# Patient Record
Sex: FEMALE | Race: WHITE | Employment: OTHER | URBAN - METROPOLITAN AREA
[De-identification: names, ages, dates, MRNs, and addresses within clinical notes are randomized per-mention and may not be internally consistent; named-entity substitution may affect disease eponyms.]

---

## 2018-02-02 ENCOUNTER — HOSPITAL ENCOUNTER (OUTPATIENT)
Dept: MAMMOGRAPHY | Age: 78
Discharge: HOME | End: 2018-02-02
Attending: SURGERY | Admitting: SURGERY

## 2018-02-02 DIAGNOSIS — Z12.31 VISIT FOR SCREENING MAMMOGRAM: ICD-10-CM

## 2018-02-22 ENCOUNTER — HOSPITAL ENCOUNTER (OUTPATIENT)
Dept: MAMMOGRAPHY | Age: 78
Discharge: HOME | End: 2018-02-22
Attending: INTERNAL MEDICINE | Admitting: INTERNAL MEDICINE

## 2018-02-22 DIAGNOSIS — R92.8 ABNORMAL FINDING ON MAMMOGRAPHY: ICD-10-CM

## 2018-08-28 ENCOUNTER — OFFICE VISIT (OUTPATIENT)
Dept: ORTHOPEDIC SURGERY | Age: 78
End: 2018-08-28

## 2018-08-28 VITALS — HEIGHT: 60 IN | WEIGHT: 136 LBS | BODY MASS INDEX: 26.7 KG/M2

## 2018-08-28 DIAGNOSIS — M77.8 LEFT WRIST TENDINITIS: ICD-10-CM

## 2018-08-28 DIAGNOSIS — M79.642 LEFT HAND PAIN: Primary | ICD-10-CM

## 2018-08-28 DIAGNOSIS — M19.049 HAND ARTHRITIS: ICD-10-CM

## 2018-08-28 PROCEDURE — 99203 OFFICE O/P NEW LOW 30 MIN: CPT | Performed by: ORTHOPAEDIC SURGERY

## 2018-08-28 PROCEDURE — L3908 WHO COCK-UP NONMOLDE PRE OTS: HCPCS | Performed by: ORTHOPAEDIC SURGERY

## 2018-08-28 RX ORDER — FEXOFENADINE HCL 180 MG/1
180 TABLET ORAL
COMMUNITY

## 2018-08-28 RX ORDER — LISINOPRIL 10 MG/1
10 TABLET ORAL
COMMUNITY

## 2018-08-28 RX ORDER — ATENOLOL 100 MG/1
100 TABLET ORAL
COMMUNITY

## 2018-08-28 RX ORDER — ESZOPICLONE 1 MG/1
TABLET, FILM COATED ORAL
COMMUNITY

## 2018-08-28 RX ORDER — GLUCOSAMINE HCL 500 MG
300 TABLET ORAL
COMMUNITY

## 2018-08-28 RX ORDER — CHLORTHALIDONE 25 MG/1
25 TABLET ORAL
COMMUNITY

## 2018-08-28 NOTE — PROGRESS NOTES
Chief Complaint  New Patient (L HAND)      History of Present Illness:  Leydi Treadwell is a [de-identified] y.o. female. She presents today for a new hand surgery specialty evaluation at the recommendation of Dr. Harleen Jacobsen regarding her left hand and wrist.  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 In the middle the night she had exceptionally significant tenderness and some swelling and did go to an emergency department in [place] was started on a prednisone pack. She also was given a wrist brace. She has felt significant relief but is now concerned about the ability to get back to doing more cooking and not use her brace. She has in the past not been able to take oral anti-inflammatory medicines. She does not remember a specific traumatic event. Medical History:  History reviewed. No pertinent past medical history. History reviewed. No pertinent surgical history. Social History     Social History    Marital status:      Spouse name: N/A    Number of children: N/A    Years of education: N/A     Social History Main Topics    Smoking status: None    Smokeless tobacco: None    Alcohol use None    Drug use: Unknown    Sexual activity: Not Asked     Other Topics Concern    None     Social History Narrative    None     Current Outpatient Prescriptions   Medication Sig Dispense Refill    apixaban (ELIQUIS) 5 MG TABS tablet Take 5 mg by mouth      lisinopril (PRINIVIL;ZESTRIL) 10 MG tablet Take 10 mg by mouth      atenolol (TENORMIN) 100 MG tablet Take 100 mg by mouth      chlorthalidone (HYGROTON) 25 MG tablet Take 25 mg by mouth      eszopiclone (LUNESTA) 1 MG TABS Take by mouth. Jeramie Ear Coenzyme Q10 100 MG TABS Take 300 mg by mouth      fexofenadine (ALLEGRA) 180 MG tablet Take 180 mg by mouth       No current left consistent with likely tendinitis versus crystalline-induced arthropathy    Impression:  Encounter Diagnoses   Name Primary?  Left hand pain Yes    Left wrist tendinitis     Hand arthritis        Office Procedures:  Orders Placed This Encounter   Procedures    XR HAND LEFT (MIN 3 VIEWS)    [facility]     Referral Priority:   Routine     Referral Type:   Eval and Treat     Referral Reason:   Specialty Services Required     Requested Specialty:   Occupational Therapy     Number of Visits Requested:   1    DJO Wrist Wrap Brace     Patient was prescribed a [place] Wrist Wrap. The left wrist will require stabilization / immobilization from this semi-rigid / rigid orthosis to improve their function. The orthosis will assist in protecting the affected area, provide functional support and facilitate healing. The patient was educated and fit by a healthcare professional with expert knowledge and specialization in brace application while under the direct supervision of the treating physician. Verbal and written instructions for the use of and application of this item were provided. They were instructed to contact the office immediately should the brace result in increased pain, decreased sensation, increased swelling or worsening of the condition. Treatment Plan:  I believe currently she exhibits some ongoing tenderness along the extensor carpi ulnaris which would warrant some further treatment. She's not able to take anti-inflammatories we will give her a sample of a topical diclofenac to see if that would be helpful. In addition, I will give her referral to the hand therapy specialist for some local modalities and some education on home exercises. Finally, we will also demonstrate a different type of wrist brace that can allow for more freedom and ability to pursue some of the activities that are important to her. This could be used intermittently.     Down the road if she starts to have repeated symptoms, further testing for crystal-induced arthropathy or gout versus pseudogout can be investigated. Please note that this transcription was created using voice recognition software. Any errors are unintentional and may be due to voice recognition transcription.

## 2020-10-13 ENCOUNTER — HOSPITAL ENCOUNTER (OUTPATIENT)
Dept: CT IMAGING | Age: 80
Discharge: HOME | End: 2020-10-13
Payer: MEDICARE

## 2020-10-13 LAB
GFR AFRICAN AMERICAN: 57
GFR NON-AFRICAN AMERICAN: 47
PERFORMED ON: ABNORMAL
POC CREATININE: 1.1 MG/DL (ref 0.6–1.2)
POC SAMPLE TYPE: ABNORMAL

## 2020-10-13 PROCEDURE — 6360000004 HC RX CONTRAST MEDICATION: Performed by: INTERNAL MEDICINE

## 2020-10-13 PROCEDURE — 82565 ASSAY OF CREATININE: CPT

## 2020-10-13 PROCEDURE — 74177 CT ABD & PELVIS W/CONTRAST: CPT

## 2020-10-13 RX ADMIN — IOHEXOL 50 ML: 240 INJECTION, SOLUTION INTRATHECAL; INTRAVASCULAR; INTRAVENOUS; ORAL at 12:31

## 2020-10-13 RX ADMIN — IOPAMIDOL 80 ML: 755 INJECTION, SOLUTION INTRAVENOUS at 12:37

## 2021-05-04 ENCOUNTER — APPOINTMENT (OUTPATIENT)
Dept: CT IMAGING | Age: 81
End: 2021-05-04
Payer: MEDICARE

## 2021-05-04 ENCOUNTER — HOSPITAL ENCOUNTER (EMERGENCY)
Age: 81
Discharge: HOME | End: 2021-05-04
Attending: EMERGENCY MEDICINE
Payer: MEDICARE

## 2021-05-04 VITALS
HEART RATE: 95 BPM | BODY MASS INDEX: 24.35 KG/M2 | DIASTOLIC BLOOD PRESSURE: 62 MMHG | WEIGHT: 124 LBS | TEMPERATURE: 98.4 F | RESPIRATION RATE: 18 BRPM | HEIGHT: 60 IN | SYSTOLIC BLOOD PRESSURE: 142 MMHG | OXYGEN SATURATION: 95 %

## 2021-05-04 DIAGNOSIS — S09.90XA CLOSED HEAD INJURY, INITIAL ENCOUNTER: Primary | ICD-10-CM

## 2021-05-04 PROCEDURE — 99283 EMERGENCY DEPT VISIT LOW MDM: CPT

## 2021-05-04 PROCEDURE — 70450 CT HEAD/BRAIN W/O DYE: CPT

## 2021-05-04 ASSESSMENT — ENCOUNTER SYMPTOMS
NAUSEA: 0
VOMITING: 0
SHORTNESS OF BREATH: 0
BACK PAIN: 0

## 2021-05-04 NOTE — ED PROVIDER NOTES
ED Attending Attestation Note     Date of evaluation: 5/4/2021    This patient was seen by the advance practice provider. I have seen and examined the patient, agree with the workup, evaluation, management and diagnosis. The care plan has been discussed. My assessment reveals an elderly female who presents with hematoma to her right frontal scalp. Patient was attempting to walk around a pole sticking up out of the ground and got too close and lost her footing, falling and striking her head. No obvious traumatic injuries to any of her extremities. She is alert and oriented. CT scan obtained due to the fact that she is on Eliquis without any evidence of underlying bleed.      Gregor Sarabia MD  05/04/21 [id number]

## 2021-05-04 NOTE — ED NOTES
Bed: A09-09  Expected date:   Expected time:   Means of arrival:   Comments:  Vladimir Alex RN  05/04/21 [id number]

## 2021-05-04 NOTE — ED PROVIDER NOTES
[address] ENCOUNTER          PHYSICIAN ASSISTANT NOTE       Date of evaluation: 5/4/2021    Chief Complaint     Head Injury (Pt tripped and fell in hosptial lobby, hitting her head. No LOC. On Eliquis)      History of Present Illness     Rosalia Brink is a 78 y.o. female with PMH of Afib on Eliquis, Myelofibrosis on experimental infusions of VetaCrit, HTN, CHF, Anemia who presents after a mechanical fall in the hospital lobby. Patient states that she was attempting to walk around a pole sticking up out of the ground and lost her footing. She was carrying some bags and unable to break her fall, striking her head on the ground. She denies loss of consciousness. She is on Eliquis. She complains of tenderness to her forehead where she struck the ground. She denies any headache, vision changes, dizziness, nausea or vomiting, neck pain or back pain, chest pain or shortness of breath, dizziness, abdominal pain, other injuries. She states her tetanus was 18 months ago. Review of Systems     Review of Systems   Constitutional: Negative for chills and fever. HENT: Negative for congestion. Eyes: Negative for visual disturbance. Respiratory: Negative for shortness of breath. Cardiovascular: Negative for chest pain. Gastrointestinal: Negative for nausea and vomiting. Genitourinary: Negative for difficulty urinating. Musculoskeletal: Negative for back pain, gait problem and neck pain. Skin: Positive for wound (abrasion). Neurological: Negative for dizziness, weakness, numbness and headaches. Psychiatric/Behavioral: The patient is not nervous/anxious. Past Medical, Surgical, Family, and Social History     She has no past medical history on file. She has no past surgical history on file. Her family history is not on file. She reports that she has quit smoking. She has never used smokeless tobacco. She reports previous alcohol use.  She reports that she does not use drugs. Medications     Current Discharge Medication List      CONTINUE these medications which have NOT CHANGED    Details   apixaban (ELIQUIS) 5 MG TABS tablet Take 5 mg by mouth 2.5 mg PO twice daily      lisinopril (PRINIVIL;ZESTRIL) 10 MG tablet Take 10 mg by mouth      atenolol (TENORMIN) 100 MG tablet Take 100 mg by mouth      chlorthalidone (HYGROTON) 25 MG tablet Take 25 mg by mouth      eszopiclone (LUNESTA) 1 MG TABS Take by mouth. .      Coenzyme Q10 100 MG TABS Take 300 mg by mouth      fexofenadine (ALLEGRA) 180 MG tablet Take 180 mg by mouth             Allergies     She is allergic to furosemide. Physical Exam     INITIAL VITALS: BP: (!) 142/62,Temp: 98.4 °F (36.9 °C), Pulse: 95, Resp: 18, SpO2: 95 %   Physical Exam  Vitals signs and nursing note reviewed. Constitutional:       General: She is not in acute distress. HENT:      Head: Normocephalic. Contusion present. No raccoon eyes or Guerrero's sign. Jaw: There is normal jaw occlusion. Comments: Contusion with overlying abrasion to the right forehead     Right Ear: No hemotympanum. Left Ear: No mastoid tenderness. No hemotympanum. Mouth/Throat:      Mouth: Mucous membranes are moist.   Eyes:      Extraocular Movements: Extraocular movements intact. Conjunctiva/sclera: Conjunctivae normal.      Pupils: Pupils are equal, round, and reactive to light. Neck:      Musculoskeletal: Normal range of motion and neck supple. No spinous process tenderness. Cardiovascular:      Rate and Rhythm: Normal rate and regular rhythm. Pulmonary:      Effort: Pulmonary effort is normal. No respiratory distress. Breath sounds: Normal breath sounds. No wheezing, rhonchi or rales. Abdominal:      General: Bowel sounds are normal. There is no distension. Palpations: Abdomen is soft. Tenderness: There is no abdominal tenderness. There is no guarding or rebound. Musculoskeletal:         General: No deformity. Skin:     General: Skin is warm and dry. Neurological:      General: No focal deficit present. Mental Status: She is alert and oriented to person, place, and time. Cranial Nerves: No cranial nerve deficit or dysarthria. Sensory: No sensory deficit. Motor: No weakness. Coordination: Finger-Nose-Finger Test normal.   Psychiatric:         Mood and Affect: Mood normal.         Behavior: Behavior normal.         Diagnostic Results     RADIOLOGY:  CT HEAD WO CONTRAST   Final Result      No acute intracranial abnormality. LABS:   No results found for this visit on 05/04/21. RECENT VITALS:  BP: (!) 142/62, Temp: 98.4 °F (36.9 °C), Pulse: 95,Resp: 18, SpO2: 95 %     Procedures         ED Course     Nursing Notes, Past Medical Hx, Past Surgical Hx, Social Hx, Allergies, and Family Hx were reviewed. The patient was given the followingmedications:  No orders of the defined types were placed in this encounter. CONSULTS:  None    MEDICAL DECISION MAKING / ASSESSMENT / Braden Reginaldo is a 78 y.o. female with past medical history of myelofibrosis on experimental medication, chronic atrial fibrillation on Eliquis, hypertension, CHF who presented to the emergency department after mechanical fall in the lobby. Patient was carrying bags and unable to catch her fall. She struck her head on the ground. No loss consciousness. She is on Eliquis. She complains of contusion and abrasion to the right forehead. Her tetanus is up-to-date. She denies any headache, vision changes, dizziness, chest pain or shortness of breath, neck pain or other injuries from the fall. Physical exam reveals a well-appearing 22-year-old female in no acute distress. She is mildly hypertensive with otherwise normal vital signs. GCS 15. No focal neurologic deficit. No C, T, L-spine tenderness to palpation or step-off deformity. Pelvis is nontender to palpation. Negative logroll.   Heart rhythm irregular. Lungs clear to auscultation. Abdomen soft nontender. CT head without contrast was obtained due to patient being anticoagulated. This revealed no acute intracranial hemorrhage. Patient stable for discharge home at this time with return precautions. This patient was also evaluated by the attending physician. All care plans were discussed and agreed upon. Clinical Impression     1. Closed head injury, initial encounter        Disposition     PATIENT REFERRED TO:  Issac France MD  [place] [id number]  [address] [address]       As needed    [facility] Emergency Department  [address] [facility] [address]  [phone]    If symptoms worsen      DISCHARGE MEDICATIONS:  Current Discharge Medication List           DISPOSITION  Discharge.        Genevieve Franklin PA-C  05/04/21 [id number]

## 2022-01-25 ENCOUNTER — HOSPITAL ENCOUNTER (OUTPATIENT)
Dept: GENERAL RADIOLOGY | Age: 82
Discharge: HOME | End: 2022-01-25
Payer: MEDICARE

## 2022-01-25 DIAGNOSIS — L02.511 ABSCESS OF FINGER OF RIGHT HAND: ICD-10-CM

## 2022-01-25 PROCEDURE — 73130 X-RAY EXAM OF HAND: CPT

## 2022-01-28 ENCOUNTER — HOSPITAL ENCOUNTER (OUTPATIENT)
Dept: GENERAL RADIOLOGY | Age: 82
Discharge: HOME | End: 2022-01-28
Payer: MEDICARE

## 2022-01-28 DIAGNOSIS — M25.572 LEFT ANKLE PAIN, UNSPECIFIED CHRONICITY: ICD-10-CM

## 2022-01-28 PROCEDURE — 73630 X-RAY EXAM OF FOOT: CPT

## 2022-01-28 PROCEDURE — 73610 X-RAY EXAM OF ANKLE: CPT

## 2022-02-11 ENCOUNTER — HOSPITAL ENCOUNTER (OUTPATIENT)
Dept: ULTRASOUND IMAGING | Age: 82
Discharge: HOME | End: 2022-02-11
Payer: MEDICARE

## 2022-02-11 DIAGNOSIS — C94.6 MYELODYSPLASTIC DISEASE (HCC): ICD-10-CM

## 2022-02-11 PROCEDURE — 76700 US EXAM ABDOM COMPLETE: CPT
